# Patient Record
Sex: FEMALE | Race: WHITE | Employment: FULL TIME | URBAN - METROPOLITAN AREA
[De-identification: names, ages, dates, MRNs, and addresses within clinical notes are randomized per-mention and may not be internally consistent; named-entity substitution may affect disease eponyms.]

---

## 2022-01-02 ENCOUNTER — APPOINTMENT (OUTPATIENT)
Dept: GENERAL RADIOLOGY | Age: 52
End: 2022-01-02
Attending: EMERGENCY MEDICINE
Payer: COMMERCIAL

## 2022-01-02 ENCOUNTER — HOSPITAL ENCOUNTER (EMERGENCY)
Age: 52
Discharge: HOME | End: 2022-01-03
Attending: EMERGENCY MEDICINE
Payer: COMMERCIAL

## 2022-01-02 VITALS
WEIGHT: 185.63 LBS | SYSTOLIC BLOOD PRESSURE: 121 MMHG | BODY MASS INDEX: 34.16 KG/M2 | TEMPERATURE: 98.2 F | HEIGHT: 62 IN | HEART RATE: 125 BPM | RESPIRATION RATE: 18 BRPM | DIASTOLIC BLOOD PRESSURE: 99 MMHG | OXYGEN SATURATION: 99 %

## 2022-01-02 DIAGNOSIS — L03.90 CELLULITIS, UNSPECIFIED CELLULITIS SITE: Primary | ICD-10-CM

## 2022-01-02 LAB
ALBUMIN SERPL-MCNC: 4 G/DL (ref 3.5–5)
ALBUMIN/GLOB SERPL: 1.1 {RATIO} (ref 1.1–2.2)
ALP SERPL-CCNC: 130 U/L (ref 45–117)
ALT SERPL-CCNC: 30 U/L (ref 12–78)
ANION GAP SERPL CALC-SCNC: 5 MMOL/L (ref 5–15)
AST SERPL-CCNC: 17 U/L (ref 15–37)
BASOPHILS # BLD: 0.1 K/UL (ref 0–0.1)
BASOPHILS NFR BLD: 1 % (ref 0–1)
BILIRUB SERPL-MCNC: 0.2 MG/DL (ref 0.2–1)
BUN SERPL-MCNC: 12 MG/DL (ref 6–20)
BUN/CREAT SERPL: 16 (ref 12–20)
CALCIUM SERPL-MCNC: 9.5 MG/DL (ref 8.5–10.1)
CHLORIDE SERPL-SCNC: 109 MMOL/L (ref 97–108)
CO2 SERPL-SCNC: 24 MMOL/L (ref 21–32)
CREAT SERPL-MCNC: 0.77 MG/DL (ref 0.55–1.02)
DIFFERENTIAL METHOD BLD: ABNORMAL
EOSINOPHIL # BLD: 0 K/UL (ref 0–0.4)
EOSINOPHIL NFR BLD: 0 % (ref 0–7)
ERYTHROCYTE [DISTWIDTH] IN BLOOD BY AUTOMATED COUNT: 13.7 % (ref 11.5–14.5)
GLOBULIN SER CALC-MCNC: 3.6 G/DL (ref 2–4)
GLUCOSE SERPL-MCNC: 126 MG/DL (ref 65–100)
HCT VFR BLD AUTO: 36.2 % (ref 35–47)
HGB BLD-MCNC: 12 G/DL (ref 11.5–16)
IMM GRANULOCYTES # BLD AUTO: 0.1 K/UL (ref 0–0.04)
IMM GRANULOCYTES NFR BLD AUTO: 0 % (ref 0–0.5)
LACTATE BLD-SCNC: 1.78 MMOL/L (ref 0.4–2)
LYMPHOCYTES # BLD: 3.3 K/UL (ref 0.8–3.5)
LYMPHOCYTES NFR BLD: 20 % (ref 12–49)
MCH RBC QN AUTO: 30.5 PG (ref 26–34)
MCHC RBC AUTO-ENTMCNC: 33.1 G/DL (ref 30–36.5)
MCV RBC AUTO: 92.1 FL (ref 80–99)
MONOCYTES # BLD: 1.4 K/UL (ref 0–1)
MONOCYTES NFR BLD: 8 % (ref 5–13)
NEUTS SEG # BLD: 12 K/UL (ref 1.8–8)
NEUTS SEG NFR BLD: 71 % (ref 32–75)
NRBC # BLD: 0 K/UL (ref 0–0.01)
NRBC BLD-RTO: 0 PER 100 WBC
PLATELET # BLD AUTO: 423 K/UL (ref 150–400)
PMV BLD AUTO: 9.1 FL (ref 8.9–12.9)
POTASSIUM SERPL-SCNC: 3.9 MMOL/L (ref 3.5–5.1)
PROT SERPL-MCNC: 7.6 G/DL (ref 6.4–8.2)
RBC # BLD AUTO: 3.93 M/UL (ref 3.8–5.2)
SODIUM SERPL-SCNC: 138 MMOL/L (ref 136–145)
WBC # BLD AUTO: 16.9 K/UL (ref 3.6–11)

## 2022-01-02 PROCEDURE — 86140 C-REACTIVE PROTEIN: CPT

## 2022-01-02 PROCEDURE — 36415 COLL VENOUS BLD VENIPUNCTURE: CPT

## 2022-01-02 PROCEDURE — 85652 RBC SED RATE AUTOMATED: CPT

## 2022-01-02 PROCEDURE — 96361 HYDRATE IV INFUSION ADD-ON: CPT

## 2022-01-02 PROCEDURE — 93005 ELECTROCARDIOGRAM TRACING: CPT

## 2022-01-02 PROCEDURE — 83605 ASSAY OF LACTIC ACID: CPT

## 2022-01-02 PROCEDURE — 80053 COMPREHEN METABOLIC PANEL: CPT

## 2022-01-02 PROCEDURE — 73562 X-RAY EXAM OF KNEE 3: CPT

## 2022-01-02 PROCEDURE — 96375 TX/PRO/DX INJ NEW DRUG ADDON: CPT

## 2022-01-02 PROCEDURE — 74011250636 HC RX REV CODE- 250/636: Performed by: EMERGENCY MEDICINE

## 2022-01-02 PROCEDURE — 99283 EMERGENCY DEPT VISIT LOW MDM: CPT

## 2022-01-02 PROCEDURE — 85025 COMPLETE CBC W/AUTO DIFF WBC: CPT

## 2022-01-02 RX ORDER — KETOROLAC TROMETHAMINE 30 MG/ML
15 INJECTION, SOLUTION INTRAMUSCULAR; INTRAVENOUS
Status: COMPLETED | OUTPATIENT
Start: 2022-01-02 | End: 2022-01-02

## 2022-01-02 RX ADMIN — SODIUM CHLORIDE 1000 ML: 9 INJECTION, SOLUTION INTRAVENOUS at 23:24

## 2022-01-02 RX ADMIN — KETOROLAC TROMETHAMINE 15 MG: 30 INJECTION, SOLUTION INTRAMUSCULAR; INTRAVENOUS at 23:25

## 2022-01-03 LAB
ATRIAL RATE: 122 BPM
CALCULATED P AXIS, ECG09: 59 DEGREES
CALCULATED R AXIS, ECG10: 47 DEGREES
CALCULATED T AXIS, ECG11: 59 DEGREES
CRP SERPL-MCNC: 2.37 MG/DL (ref 0–0.6)
DIAGNOSIS, 93000: NORMAL
ERYTHROCYTE [SEDIMENTATION RATE] IN BLOOD: 37 MM/HR (ref 0–30)
P-R INTERVAL, ECG05: 142 MS
Q-T INTERVAL, ECG07: 318 MS
QRS DURATION, ECG06: 72 MS
QTC CALCULATION (BEZET), ECG08: 453 MS
VENTRICULAR RATE, ECG03: 122 BPM

## 2022-01-03 PROCEDURE — 96365 THER/PROPH/DIAG IV INF INIT: CPT

## 2022-01-03 PROCEDURE — 74011000258 HC RX REV CODE- 258: Performed by: EMERGENCY MEDICINE

## 2022-01-03 PROCEDURE — 74011250636 HC RX REV CODE- 250/636: Performed by: EMERGENCY MEDICINE

## 2022-01-03 RX ORDER — HYDROCODONE BITARTRATE AND ACETAMINOPHEN 5; 325 MG/1; MG/1
1 TABLET ORAL
Qty: 10 TABLET | Refills: 0 | Status: SHIPPED | OUTPATIENT
Start: 2022-01-03 | End: 2022-01-06

## 2022-01-03 RX ORDER — CEPHALEXIN 500 MG/1
500 CAPSULE ORAL 4 TIMES DAILY
Qty: 28 CAPSULE | Refills: 0 | Status: SHIPPED | OUTPATIENT
Start: 2022-01-03 | End: 2022-01-10

## 2022-01-03 RX ORDER — SULFAMETHOXAZOLE AND TRIMETHOPRIM 800; 160 MG/1; MG/1
1 TABLET ORAL 2 TIMES DAILY
Qty: 14 TABLET | Refills: 0 | Status: SHIPPED | OUTPATIENT
Start: 2022-01-03 | End: 2022-01-10

## 2022-01-03 RX ORDER — VANCOMYCIN 2 GRAM/500 ML IN 0.9 % SODIUM CHLORIDE INTRAVENOUS
2000
Status: DISCONTINUED | OUTPATIENT
Start: 2022-01-03 | End: 2022-01-03

## 2022-01-03 RX ORDER — VANCOMYCIN/0.9 % SOD CHLORIDE 1.5G/250ML
1500 PLASTIC BAG, INJECTION (ML) INTRAVENOUS
Status: DISCONTINUED | OUTPATIENT
Start: 2022-01-03 | End: 2022-01-03 | Stop reason: DRUGHIGH

## 2022-01-03 RX ADMIN — CEFTRIAXONE 1 G: 1 INJECTION, POWDER, FOR SOLUTION INTRAMUSCULAR; INTRAVENOUS at 00:35

## 2022-01-03 NOTE — ED PROVIDER NOTES
EMERGENCY DEPARTMENT HISTORY AND PHYSICAL EXAM      Date: 1/2/2022  Patient Name: Pricilla Barraza  Patient Age and Sex: 46 y.o. female     History of Presenting Illness     Chief Complaint   Patient presents with    Knee Pain     Reports new onset of right knee pain, \"popping feeling\", and swelling starting today. Skin over right knee is red and warm, 8 weeks post op knee replacement. Denied hx of blood clots. History Provided By: Patient    HPI: Pricilla Barraza  Is a 46year old recent TKA on R 11/3 with Elvi Friend presenting with warmth, swelling, and redness over joint over the past 24 hours. She reports popping sensation in the joint with ambulation as well as swelling, worsening pain, and redness and warmth over the anterior aspect of the joint. No trauma. No fever, nausea, or vomiting. Follow up tomorrow afternoon with [facility]     History DM, osteoarthritis    There are no other complaints, changes, or physical findings at this time. PCP: Aric Vasquez MD    No current facility-administered medications on file prior to encounter. Current Outpatient Medications on File Prior to Encounter   Medication Sig Dispense Refill    oxyCODONE IR (ROXICODONE) 5 mg immediate release tablet Take 2-3 Tabs by mouth every three (3) hours as needed. Max Daily Amount: 120 mg. Indications: PAIN 100 Tab 0    metFORMIN (GLUCOPHAGE) 1,000 mg tablet Take 1,000 mg by mouth two (2) times daily (with meals).  ketoconazole (NIZORAL) 2 % topical cream Apply  to affected area daily. 30 g 3    GABAPENTIN PO Take 300 mg by mouth three (3) times daily.  DULoxetine (CYMBALTA) 60 mg capsule Take 60 mg by mouth daily.            Past History     Past Medical History:  Past Medical History:   Diagnosis Date    Arthritis     Psychiatric disorder     depression       Past Surgical History:  Past Surgical History:   Procedure Laterality Date    HX APPENDECTOMY      HX HYSTERECTOMY      HX ORTHOPAEDIC      bilateral knee femoral osteotomies       Family History:  Family History   Problem Relation Age of Onset    Cancer Mother         Liver and Lung        Social History:  Social History     Tobacco Use    Smoking status: Current Every Day Smoker     Packs/day: 0.00     Years: 20.00     Pack years: 0.00     Last attempt to quit: 2016     Years since quittin.4    Smokeless tobacco: Never Used   Substance Use Topics    Alcohol use: Yes     Comment: rare    Drug use: No       Allergies: Allergies   Allergen Reactions    Dynacin [Minocycline] Hives    Morphine Itching         Review of Systems   Review of Systems   Constitutional: Negative for chills and fever. HENT: Negative for congestion and rhinorrhea. Respiratory: Negative for shortness of breath. Cardiovascular: Negative for chest pain. Gastrointestinal: Negative for abdominal pain, nausea and vomiting. Genitourinary: Negative for dysuria and frequency. Musculoskeletal: Positive for arthralgias. Negative for myalgias. Skin: Positive for rash. All other systems reviewed and are negative. Physical Exam   Physical Exam  Vitals and nursing note reviewed. Constitutional:       General: She is not in acute distress. Appearance: Normal appearance. She is not ill-appearing. HENT:      Head: Normocephalic. Mouth/Throat:      Mouth: Mucous membranes are moist.   Eyes:      Conjunctiva/sclera: Conjunctivae normal.   Cardiovascular:      Rate and Rhythm: Normal rate and regular rhythm. Pulses: Normal pulses. Pulmonary:      Effort: Pulmonary effort is normal.      Breath sounds: Normal breath sounds. Abdominal:      General: Abdomen is flat. Palpations: Abdomen is soft. Musculoskeletal:         General: No deformity. Comments: Range of motion is not limited by pain with full flexion and extension passive and actively. Small effusion and warmth to R knee. Skin:     General: Skin is warm.       Findings: Erythema (to anterior knee) present. Neurological:      Mental Status: She is alert and oriented to person, place, and time. Mental status is at baseline. Psychiatric:         Behavior: Behavior normal.         Thought Content: Thought content normal.          Diagnostic Study Results     Labs  Recent Results (from the past 12 hour(s))   EKG, 12 LEAD, INITIAL    Collection Time: 01/02/22  9:22 PM   Result Value Ref Range    Ventricular Rate 122 BPM    Atrial Rate 122 BPM    P-R Interval 142 ms    QRS Duration 72 ms    Q-T Interval 318 ms    QTC Calculation (Bezet) 453 ms    Calculated P Axis 59 degrees    Calculated R Axis 47 degrees    Calculated T Axis 59 degrees    Diagnosis        Suspect unspecified pacemaker failure  Sinus tachycardia  T wave abnormality, consider lateral ischemia  When compared with ECG of 11-JUL-2016 13:11,  T wave inversion now evident in Lateral leads     CBC WITH AUTOMATED DIFF    Collection Time: 01/02/22  9:27 PM   Result Value Ref Range    WBC 16.9 (H) 3.6 - 11.0 K/uL    RBC 3.93 3.80 - 5.20 M/uL    HGB 12.0 11.5 - 16.0 g/dL    HCT 36.2 35.0 - 47.0 %    MCV 92.1 80.0 - 99.0 FL    MCH 30.5 26.0 - 34.0 PG    MCHC 33.1 30.0 - 36.5 g/dL    RDW 13.7 11.5 - 14.5 %    PLATELET 272 (H) 809 - 400 K/uL    MPV 9.1 8.9 - 12.9 FL    NRBC 0.0 0  WBC    ABSOLUTE NRBC 0.00 0.00 - 0.01 K/uL    NEUTROPHILS 71 32 - 75 %    LYMPHOCYTES 20 12 - 49 %    MONOCYTES 8 5 - 13 %    EOSINOPHILS 0 0 - 7 %    BASOPHILS 1 0 - 1 %    IMMATURE GRANULOCYTES 0 0.0 - 0.5 %    ABS. NEUTROPHILS 12.0 (H) 1.8 - 8.0 K/UL    ABS. LYMPHOCYTES 3.3 0.8 - 3.5 K/UL    ABS. MONOCYTES 1.4 (H) 0.0 - 1.0 K/UL    ABS. EOSINOPHILS 0.0 0.0 - 0.4 K/UL    ABS. BASOPHILS 0.1 0.0 - 0.1 K/UL    ABS. IMM.  GRANS. 0.1 (H) 0.00 - 0.04 K/UL    DF AUTOMATED     METABOLIC PANEL, COMPREHENSIVE    Collection Time: 01/02/22  9:27 PM   Result Value Ref Range    Sodium 138 136 - 145 mmol/L    Potassium 3.9 3.5 - 5.1 mmol/L    Chloride 109 (H) 97 - 108 mmol/L    CO2 24 21 - 32 mmol/L    Anion gap 5 5 - 15 mmol/L    Glucose 126 (H) 65 - 100 mg/dL    BUN 12 6 - 20 MG/DL    Creatinine 0.77 0.55 - 1.02 MG/DL    BUN/Creatinine ratio 16 12 - 20      GFR est AA >60 >60 ml/min/1.73m2    GFR est non-AA >60 >60 ml/min/1.73m2    Calcium 9.5 8.5 - 10.1 MG/DL    Bilirubin, total 0.2 0.2 - 1.0 MG/DL    ALT (SGPT) 30 12 - 78 U/L    AST (SGOT) 17 15 - 37 U/L    Alk. phosphatase 130 (H) 45 - 117 U/L    Protein, total 7.6 6.4 - 8.2 g/dL    Albumin 4.0 3.5 - 5.0 g/dL    Globulin 3.6 2.0 - 4.0 g/dL    A-G Ratio 1.1 1.1 - 2.2     SED RATE (ESR)    Collection Time: 01/02/22  9:27 PM   Result Value Ref Range    Sed rate, automated 37 (H) 0 - 30 mm/hr   POC LACTIC ACID    Collection Time: 01/02/22  9:41 PM   Result Value Ref Range    Lactic Acid (POC) 1.78 0.40 - 2.00 mmol/L       Radiologic Studies -   XR KNEE RT 3 V   Final Result   No acute bony abnormality. Satisfactory prosthesis alignment. Mild   diffuse soft tissue swelling. CT Results  (Last 48 hours)    None        CXR Results  (Last 48 hours)    None            Medical Decision Making   I am the first provider for this patient. I reviewed the vital signs, available nursing notes, past medical history, past surgical history, family history and social history. Vital Signs-Reviewed the patient's vital signs. Patient Vitals for the past 12 hrs:   Temp Pulse Resp BP SpO2   01/02/22 2116 98.2 °F (36.8 °C) (!) 125 18 (!) 121/99 99 %       Records Reviewed: Nursing Notes and Old Medical Records    Provider Notes (Medical Decision Making):   Differential includes migrating or broken hardware, fracture, infection including cellulitis or septic arthritis    Will obtain XR, give toradol, IVF for tachycardia. Will consult orthopedic surgery for discussion of possible arthrocentesis. ED Course:   Initial assessment performed.  The patients presenting problems have been discussed, and they are in agreement with the care plan formulated and outlined with them. I have encouraged them to ask questions as they arise throughout their visit. ED Course as of 01/03/22 0249   Nirmala Quintanilla Jan 02, 2022 2234 patient underwent total knee replacement 11/3 with Dr. Nakia Pandey  [WB]   8723 White count of 16.9 with thrombocytosis 423, normal differential [WB]   2235 Lactate 1.8 [WB]   2235 Tachycardic to 125 with blood pressure 121/100 [WB]   2315 X-ray images personally viewed by myself did not demonstrate any abnormality [WB]   2316 , Mild diffuse soft tissue swelling noted [WB]   2320 Perfect serve message sent to orthopedic PA [WB]   Mon Jan 03, 2022   0109  at this time, getting ceftriaxone [WB]   0141 Patient lives an hour away with worsening weather and for safety planning, will discontinue 2 hour vancomycin infusion and have patient start Bactrim on discharge [WB]      ED Course User Index  [WB] Shar Edwards MD     Disposition:  Discharge Note:  The patient has been re-evaluated and is ready for discharge. Reviewed available results with patient. Counseled patient on diagnosis and care plan. Patient has expressed understanding, and all questions have been answered. Patient agrees with plan and agrees to follow up as recommended, or to return to the ED if their symptoms worsen. Discharge instructions have been provided and explained to the patient, along with reasons to return to the ED. PLAN:  Discharge Medication List as of 1/3/2022  1:36 AM      START taking these medications    Details   trimethoprim-sulfamethoxazole (Bactrim DS) 160-800 mg per tablet Take 1 Tablet by mouth two (2) times a day for 7 days. , Normal, Disp-14 Tablet, R-0      cephALEXin (Keflex) 500 mg capsule Take 1 Capsule by mouth four (4) times daily for 7 days. , Normal, Disp-28 Capsule, R-0      HYDROcodone-acetaminophen (Norco) 5-325 mg per tablet Take 1 Tablet by mouth every six (6) hours as needed for Pain for up to 3 days. Max Daily Amount: 4 Tablets. , Normal, Disp-10 Tablet, R-0         CONTINUE these medications which have NOT CHANGED    Details   oxyCODONE IR (ROXICODONE) 5 mg immediate release tablet Take 2-3 Tabs by mouth every three (3) hours as needed. Max Daily Amount: 120 mg. Indications: PAIN, Print, Disp-100 Tab, R-0      metFORMIN (GLUCOPHAGE) 1,000 mg tablet Take 1,000 mg by mouth two (2) times daily (with meals). , Historical Med      ketoconazole (NIZORAL) 2 % topical cream Apply  to affected area daily. , Normal, Disp-30 g, R-3      GABAPENTIN PO Take 300 mg by mouth three (3) times daily. , Historical Med      DULoxetine (CYMBALTA) 60 mg capsule Take 60 mg by mouth daily. , Historical Med           2. Follow-up Information     Follow up With Specialties Details Why Contact Info    Roxianne Kocher, MD Orthopedic Surgery  Follow up as scheduled tomorrow for repeat evaluation [address]  [address] [facility],[address]  [phone]          3. Return to ED if worse     Diagnosis     Clinical Impression:   1. Cellulitis, unspecified cellulitis site        Attestations:    Radha Smith M.D. Please note that this dictation was completed with [employer], the computer voice recognition software. Quite often unanticipated grammatical, syntax, homophones, and other interpretive errors are inadvertently transcribed by the computer software. Please disregard these errors. Please excuse any errors that have escaped final proofreading. Thank you.

## 2022-01-03 NOTE — CONSULTS
ORTHOPAEDIC CONSULT NOTE    Subjective:     Date of Consultation:  January 3, 2022      Mireille Gongora is a 46 y.o. female who is being seen for Right knee swelling and discomfort, concern for infection. Pt reports TKR 11/3 with Dr Aaliyah Marcial. C/O swelling and minimal discomfort about the right knee- onset evening 2022. States she had been doing very well since surgery, ambulating without assistance, progressing her ROM. Felt a \"pop\" yesterday while walking. Denies F/C/Flu like symptoms   Denies injury or trauma.  at bedside    Patient Active Problem List    Diagnosis Date Noted    Localized primary osteoarthritis of left lower leg 2016    Primary localized osteoarthritis of left knee 2016    Fungal rash of trunk 2015     Family History   Problem Relation Age of Onset    Cancer Mother         Liver and Lung       Social History     Tobacco Use    Smoking status: Current Every Day Smoker     Packs/day: 0.00     Years: 20.00     Pack years: 0.00     Last attempt to quit: 2016     Years since quittin.4    Smokeless tobacco: Never Used   Substance Use Topics    Alcohol use: Yes     Comment: rare     Past Medical History:   Diagnosis Date    Arthritis     Psychiatric disorder     depression      Past Surgical History:   Procedure Laterality Date    HX APPENDECTOMY      HX HYSTERECTOMY      HX ORTHOPAEDIC      bilateral knee femoral osteotomies      Prior to Admission medications    Medication Sig Start Date End Date Taking? Authorizing Provider   oxyCODONE IR (ROXICODONE) 5 mg immediate release tablet Take 2-3 Tabs by mouth every three (3) hours as needed. Max Daily Amount: 120 mg. Indications: PAIN 16   Galdino Valencia, NP   metFORMIN (GLUCOPHAGE) 1,000 mg tablet Take 1,000 mg by mouth two (2) times daily (with meals). Provider, Historical   ketoconazole (NIZORAL) 2 % topical cream Apply  to affected area daily.  8/31/15   Ramona Mcmahon MD GABAPENTIN PO Take 300 mg by mouth three (3) times daily. Cass Castillo MD   DULoxetine (CYMBALTA) 60 mg capsule Take 60 mg by mouth daily. Cass Castillo MD     No current facility-administered medications for this encounter. Current Outpatient Medications   Medication Sig    oxyCODONE IR (ROXICODONE) 5 mg immediate release tablet Take 2-3 Tabs by mouth every three (3) hours as needed. Max Daily Amount: 120 mg. Indications: PAIN    metFORMIN (GLUCOPHAGE) 1,000 mg tablet Take 1,000 mg by mouth two (2) times daily (with meals).  ketoconazole (NIZORAL) 2 % topical cream Apply  to affected area daily.  GABAPENTIN PO Take 300 mg by mouth three (3) times daily.  DULoxetine (CYMBALTA) 60 mg capsule Take 60 mg by mouth daily. Allergies   Allergen Reactions    Dynacin [Minocycline] Hives    Morphine Itching        Review of Systems:  A comprehensive review of systems was negative except for that written in the HPI. Mental Status: no dementia    Objective:     Patient Vitals for the past 8 hrs:   BP Temp Pulse Resp SpO2 Height Weight   22 2116 (!) 121/99 98.2 °F (36.8 °C) (!) 125 18 99 % 5' 2\" (1.575 m) 84.2 kg (185 lb 10 oz)     Temp (24hrs), Av.2 °F (36.8 °C), Min:98.2 °F (36.8 °C), Max:98.2 °F (36.8 °C)      Gen: Well-developed,  in no acute distress   HEENT: Pink conjunctivae, hearing intact to voice, moist mucous membranes   Neck: Supple  Resp: No respiratory distress   Card: RRR, palpable distal pulse-equal bilaterally, birsk cap refill all distal digits   Abd:  non-distended  Musc: Right knee with well healed midline TKR scar, minimal erythema about the distal incision. + soft tissue swelling about the right anterior knee, NO sig effusion, NO sig ttp about the right knee. Full ext, flex 110(painfree). Intact ext mech and SLR, No collateral instability   Skin: No skin breakdown noted.  Skin warm, pink, dry  Neuro: Cranial nerves are grossly intact, no focal motor weakness, follows commands appropriately   Psych: Good insight, oriented to person, place and time, alert                                     Imaging Review: EXAM:  CR knee right 3 views  INDICATION: Right knee pain and swelling  COMPARISON: 9/19/2007. TECHNIQUE: 3 views right knee  FINDINGS: There is no acute fracture or bony erosion. There is satisfactory  alignment of a prosthesis. A benign bony lesion, such as an enchondroma, is  noted in the proximal fibula. There is no joint effusion. There is mild diffuse soft tissue swelling. IMPRESSION  No acute bony abnormality. Satisfactory prosthesis alignment. Mild  diffuse soft tissue swelling. Labs:   Recent Results (from the past 24 hour(s))   EKG, 12 LEAD, INITIAL    Collection Time: 01/02/22  9:22 PM   Result Value Ref Range    Ventricular Rate 122 BPM    Atrial Rate 122 BPM    P-R Interval 142 ms    QRS Duration 72 ms    Q-T Interval 318 ms    QTC Calculation (Bezet) 453 ms    Calculated P Axis 59 degrees    Calculated R Axis 47 degrees    Calculated T Axis 59 degrees    Diagnosis       ** Suspect unspecified pacemaker failure  Sinus tachycardia  T wave abnormality, consider lateral ischemia  When compared with ECG of 11-JUL-2016 13:11,  T wave inversion now evident in Lateral leads     CBC WITH AUTOMATED DIFF    Collection Time: 01/02/22  9:27 PM   Result Value Ref Range    WBC 16.9 (H) 3.6 - 11.0 K/uL    RBC 3.93 3.80 - 5.20 M/uL    HGB 12.0 11.5 - 16.0 g/dL    HCT 36.2 35.0 - 47.0 %    MCV 92.1 80.0 - 99.0 FL    MCH 30.5 26.0 - 34.0 PG    MCHC 33.1 30.0 - 36.5 g/dL    RDW 13.7 11.5 - 14.5 %    PLATELET 392 (H) 260 - 400 K/uL    MPV 9.1 8.9 - 12.9 FL    NRBC 0.0 0  WBC    ABSOLUTE NRBC 0.00 0.00 - 0.01 K/uL    NEUTROPHILS 71 32 - 75 %    LYMPHOCYTES 20 12 - 49 %    MONOCYTES 8 5 - 13 %    EOSINOPHILS 0 0 - 7 %    BASOPHILS 1 0 - 1 %    IMMATURE GRANULOCYTES 0 0.0 - 0.5 %    ABS. NEUTROPHILS 12.0 (H) 1.8 - 8.0 K/UL    ABS.  LYMPHOCYTES 3.3 0.8 - 3.5 K/UL ABS. MONOCYTES 1.4 (H) 0.0 - 1.0 K/UL    ABS. EOSINOPHILS 0.0 0.0 - 0.4 K/UL    ABS. BASOPHILS 0.1 0.0 - 0.1 K/UL    ABS. IMM. GRANS. 0.1 (H) 0.00 - 0.04 K/UL    DF AUTOMATED     METABOLIC PANEL, COMPREHENSIVE    Collection Time: 01/02/22  9:27 PM   Result Value Ref Range    Sodium 138 136 - 145 mmol/L    Potassium 3.9 3.5 - 5.1 mmol/L    Chloride 109 (H) 97 - 108 mmol/L    CO2 24 21 - 32 mmol/L    Anion gap 5 5 - 15 mmol/L    Glucose 126 (H) 65 - 100 mg/dL    BUN 12 6 - 20 MG/DL    Creatinine 0.77 0.55 - 1.02 MG/DL    BUN/Creatinine ratio 16 12 - 20      GFR est AA >60 >60 ml/min/1.73m2    GFR est non-AA >60 >60 ml/min/1.73m2    Calcium 9.5 8.5 - 10.1 MG/DL    Bilirubin, total 0.2 0.2 - 1.0 MG/DL    ALT (SGPT) 30 12 - 78 U/L    AST (SGOT) 17 15 - 37 U/L    Alk. phosphatase 130 (H) 45 - 117 U/L    Protein, total 7.6 6.4 - 8.2 g/dL    Albumin 4.0 3.5 - 5.0 g/dL    Globulin 3.6 2.0 - 4.0 g/dL    A-G Ratio 1.1 1.1 - 2.2     POC LACTIC ACID    Collection Time: 01/02/22  9:41 PM   Result Value Ref Range    Lactic Acid (POC) 1.78 0.40 - 2.00 mmol/L         Impression:     Patient Active Problem List    Diagnosis Date Noted    Localized primary osteoarthritis of left lower leg 07/25/2016    Primary localized osteoarthritis of left knee 07/25/2016    Fungal rash of trunk 08/31/2015     Active Problems:    * No active hospital problems. *      Plan:       Concern for cellulitis  Septic joint unlikely at this time. WBAT  Rest, ICE and elevation    Recommend starting ABX in the ED - coverage for MRSA/MSSA  DC with PO ABX  Office FU tomorrow with the [facility] joint team    Dr. Jaya Etienne  aware and agrees with plan as above.         Christiano Mcgrath PA-C  [employer]

## 2023-05-11 RX ORDER — OXYCODONE HYDROCHLORIDE 5 MG/1
TABLET ORAL
COMMUNITY
Start: 2016-07-27

## 2023-05-11 RX ORDER — KETOCONAZOLE 20 MG/G
CREAM TOPICAL DAILY
COMMUNITY
Start: 2015-08-31

## 2023-05-11 RX ORDER — DULOXETIN HYDROCHLORIDE 60 MG/1
60 CAPSULE, DELAYED RELEASE ORAL DAILY
COMMUNITY